# Patient Record
Sex: FEMALE | Race: WHITE | NOT HISPANIC OR LATINO | Employment: FULL TIME | URBAN - METROPOLITAN AREA
[De-identification: names, ages, dates, MRNs, and addresses within clinical notes are randomized per-mention and may not be internally consistent; named-entity substitution may affect disease eponyms.]

---

## 2018-10-15 ENCOUNTER — HOSPITAL ENCOUNTER (EMERGENCY)
Facility: HOSPITAL | Age: 23
Discharge: HOME | End: 2018-10-15
Attending: EMERGENCY MEDICINE

## 2018-10-15 VITALS
BODY MASS INDEX: 20.37 KG/M2 | SYSTOLIC BLOOD PRESSURE: 115 MMHG | HEART RATE: 83 BPM | WEIGHT: 110.69 LBS | RESPIRATION RATE: 14 BRPM | TEMPERATURE: 99 F | DIASTOLIC BLOOD PRESSURE: 67 MMHG | OXYGEN SATURATION: 97 % | HEIGHT: 62 IN

## 2018-10-15 DIAGNOSIS — T14.8XXA MUSCLE STRAIN: Primary | ICD-10-CM

## 2018-10-15 DIAGNOSIS — M79.669 CALF PAIN: ICD-10-CM

## 2018-10-15 LAB
B-HCG UR QL: NEGATIVE
CTP QC/QA: YES

## 2018-10-15 PROCEDURE — 81025 URINE PREGNANCY TEST: CPT | Performed by: NURSE PRACTITIONER

## 2018-10-15 PROCEDURE — 63600175 PHARM REV CODE 636 W HCPCS: Performed by: NURSE PRACTITIONER

## 2018-10-15 PROCEDURE — 99284 EMERGENCY DEPT VISIT MOD MDM: CPT | Mod: 25

## 2018-10-15 RX ORDER — KETOROLAC TROMETHAMINE 30 MG/ML
30 INJECTION, SOLUTION INTRAMUSCULAR; INTRAVENOUS
Status: COMPLETED | OUTPATIENT
Start: 2018-10-15 | End: 2018-10-15

## 2018-10-15 RX ORDER — METHOCARBAMOL 500 MG/1
1000 TABLET, FILM COATED ORAL 3 TIMES DAILY
Qty: 30 TABLET | Refills: 0 | Status: SHIPPED | OUTPATIENT
Start: 2018-10-15 | End: 2018-10-20

## 2018-10-15 RX ORDER — NAPROXEN 500 MG/1
500 TABLET ORAL 2 TIMES DAILY WITH MEALS
Qty: 10 TABLET | Refills: 0 | Status: SHIPPED | OUTPATIENT
Start: 2018-10-15 | End: 2020-09-16

## 2018-10-15 RX ADMIN — KETOROLAC TROMETHAMINE 30 MG: 30 INJECTION, SOLUTION INTRAMUSCULAR at 05:10

## 2018-10-15 NOTE — ED PROVIDER NOTES
Encounter Date: 10/15/2018       History     Chief Complaint   Patient presents with    Leg Pain     right leg pain and discomfort for one month     Patient is 23-year-old female who denies past medical history who presents to ED with right lower leg pain and discomfort x1 month. Patient complained of pain to right calf and bottom of right foot.Denies trauma or injury.  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  Reports that the pain is worse with palpation.  Denies any alleviating factors.  Denies taking any medications for symptoms.  Denies fever, chills, neck pain/stiffness, SOB, chest pain, numbness, tingling.  Denies any other complaints at this time.      The history is provided by the patient.     Review of patient's allergies indicates:  No Known Allergies  History reviewed. No pertinent past medical history.  History reviewed. No pertinent surgical history.  History reviewed. No pertinent family history.  Social History     Tobacco Use    Smoking status: Current Every Day Smoker   Substance Use Topics    Alcohol use: No    Drug use: Not on file     Review of Systems   Constitutional: Negative for fever.   Respiratory: Negative for shortness of breath.    Cardiovascular: Negative for chest pain.   Musculoskeletal: Positive for arthralgias (right lower leg and foot pain). Negative for back pain, gait problem, joint swelling, neck pain and neck stiffness.   Neurological: Negative for weakness and numbness.   All other systems reviewed and are negative.      Physical Exam     Initial Vitals [10/15/18 1542]   BP Pulse Resp Temp SpO2   115/67 83 14 98.6 °F (37 °C) 97 %      MAP       --         Physical Exam    Vitals reviewed.  Constitutional: She appears well-developed and well-nourished. She is not diaphoretic. She is cooperative.  Non-toxic appearance. She does not have a sickly appearance.   HENT:   Head: Atraumatic.   Eyes: Pupils are equal, round, and reactive to light.   Neck:  Normal range of motion, full passive range of motion without pain and phonation normal. Neck supple.   Cardiovascular: Regular rhythm.   Pulses:       Dorsalis pedis pulses are 2+ on the right side, and 2+ on the left side.   TTP to posterior calf.   No calf swelling noted.  Negative Sherry's sign.   Pulmonary/Chest: Effort normal and breath sounds normal. No respiratory distress.   Musculoskeletal:   TTP to plantar aspect of right foot.   No warmth or surrounding erythema.    Neurological: She is alert and oriented to person, place, and time. Gait normal.   Steady gait.    Skin: Skin is warm, dry and intact. Capillary refill takes less than 2 seconds. No rash noted.   Psychiatric: She has a normal mood and affect.         ED Course   Procedures  Labs Reviewed   POCT URINE PREGNANCY          Imaging Results          US Lower Extremity Veins Right (Final result)  Result time 10/15/18 16:44:05    Final result by Edvin Hickey MD (10/15/18 16:44:05)                 Impression:      No evidence of deep venous thrombosis in the right lower extremity.      Electronically signed by: Edvin Hickey MD  Date:    10/15/2018  Time:    16:44             Narrative:    EXAMINATION:  US LOWER EXTREMITY VEINS RIGHT    CLINICAL HISTORY:  Pain in unspecified lower leg    TECHNIQUE:  Duplex and color flow Doppler evaluation and graded compression of the right lower extremity veins was performed.    COMPARISON:  None    FINDINGS:  Right thigh veins: The common femoral, femoral, popliteal, upper greater saphenous, and deep femoral veins are patent and free of thrombus. The veins are normally compressible and have normal phasic flow and augmentation response.    Right calf veins: The visualized calf veins are patent.    Contralateral CFV: The contralateral (left) common femoral vein is patent and free of thrombus.    Miscellaneous: None                                 Medical Decision Making:   History:   Old Medical Records: I decided  to obtain old medical records.  Initial Assessment:   Patient presents to ED with right lower leg pain and discomfort x1 month.  Appears well, nontoxic.  Afebrile.  TTP to posterior calf.  No calf swelling noted. Negative Homans signs. Respirations even unlabored.  Lungs CTA.  No respiratory distress.  TTP to plantar aspect of right foot.  No warmth or surrounding erythema.  Steady gait.  Differential Diagnosis:   Sprain, strain, DVT  Clinical Tests:   Lab Tests: Ordered and Reviewed  Radiological Study: Ordered and Reviewed  ED Management:  POT pregnancy, US, pain meds  UPT negative.  No signs of cellulitis, septic joint, or DVT.  Patient's signs and symptoms most likely due to muscle strain.  Patient is stable will be DC home with prescriptions for Robaxin and naproxen.  Patient instructed not to drive, drink alcohol, or operate machinery while taking Robaxin.  Patient instructed to follow up PCP and Podiatry tomorrow and to return to ED for any concerns.  Patient verbalizes DC instructions and is in compliance and agreement with treatment plan.                      Clinical Impression:   The primary encounter diagnosis was Muscle strain. A diagnosis of Calf pain was also pertinent to this visit.                             Chuy Enrique NP  10/17/18 0733

## 2018-10-15 NOTE — DISCHARGE INSTRUCTIONS
Please takePrescribed medication as labeled as needed for pain. Do not drive, drink alcohol, or operate machinery while taking Robaxin.  Follow up with PCP and Podiatry tomorrow and return to ED for any concerns.

## 2020-09-16 ENCOUNTER — HOSPITAL ENCOUNTER (EMERGENCY)
Facility: HOSPITAL | Age: 25
Discharge: PSYCHIATRIC OR SUBSTANCE USE FACILITY | End: 2020-09-16
Attending: EMERGENCY MEDICINE
Payer: MEDICAID

## 2020-09-16 VITALS
RESPIRATION RATE: 16 BRPM | TEMPERATURE: 99 F | HEART RATE: 78 BPM | OXYGEN SATURATION: 99 % | SYSTOLIC BLOOD PRESSURE: 121 MMHG | DIASTOLIC BLOOD PRESSURE: 70 MMHG

## 2020-09-16 DIAGNOSIS — F22 PARANOIA: ICD-10-CM

## 2020-09-16 DIAGNOSIS — F23 ACUTE PSYCHOSIS: Primary | ICD-10-CM

## 2020-09-16 DIAGNOSIS — Z00.8 MEDICAL CLEARANCE FOR PSYCHIATRIC ADMISSION: ICD-10-CM

## 2020-09-16 DIAGNOSIS — F22 DELUSIONS: ICD-10-CM

## 2020-09-16 PROBLEM — F29 PSYCHOSIS: Status: ACTIVE | Noted: 2020-09-16

## 2020-09-16 LAB
ALBUMIN SERPL BCP-MCNC: 4.7 G/DL (ref 3.5–5.2)
ALP SERPL-CCNC: 60 U/L (ref 55–135)
ALT SERPL W/O P-5'-P-CCNC: 17 U/L (ref 10–44)
AMPHET+METHAMPHET UR QL: NEGATIVE
ANION GAP SERPL CALC-SCNC: 13 MMOL/L (ref 8–16)
APAP SERPL-MCNC: <3 UG/ML (ref 10–20)
AST SERPL-CCNC: 22 U/L (ref 10–40)
B-HCG UR QL: NEGATIVE
BARBITURATES UR QL SCN>200 NG/ML: NEGATIVE
BASOPHILS # BLD AUTO: 0.03 K/UL (ref 0–0.2)
BASOPHILS NFR BLD: 0.4 % (ref 0–1.9)
BENZODIAZ UR QL SCN>200 NG/ML: NEGATIVE
BILIRUB SERPL-MCNC: 1.1 MG/DL (ref 0.1–1)
BILIRUB UR QL STRIP: ABNORMAL
BUN SERPL-MCNC: 8 MG/DL (ref 6–20)
BZE UR QL SCN: NEGATIVE
CALCIUM SERPL-MCNC: 9.4 MG/DL (ref 8.7–10.5)
CANNABINOIDS UR QL SCN: NORMAL
CHLORIDE SERPL-SCNC: 104 MMOL/L (ref 95–110)
CLARITY UR: ABNORMAL
CO2 SERPL-SCNC: 21 MMOL/L (ref 23–29)
COLOR UR: YELLOW
CREAT SERPL-MCNC: 0.7 MG/DL (ref 0.5–1.4)
CREAT UR-MCNC: 224.3 MG/DL (ref 15–325)
CTP QC/QA: YES
DIFFERENTIAL METHOD: ABNORMAL
EOSINOPHIL # BLD AUTO: 0 K/UL (ref 0–0.5)
EOSINOPHIL NFR BLD: 0.1 % (ref 0–8)
ERYTHROCYTE [DISTWIDTH] IN BLOOD BY AUTOMATED COUNT: 12.1 % (ref 11.5–14.5)
EST. GFR  (AFRICAN AMERICAN): >60 ML/MIN/1.73 M^2
EST. GFR  (NON AFRICAN AMERICAN): >60 ML/MIN/1.73 M^2
ETHANOL SERPL-MCNC: <10 MG/DL
GLUCOSE SERPL-MCNC: 151 MG/DL (ref 70–110)
GLUCOSE UR QL STRIP: NEGATIVE
HCT VFR BLD AUTO: 40 % (ref 37–48.5)
HGB BLD-MCNC: 14.2 G/DL (ref 12–16)
HGB UR QL STRIP: ABNORMAL
IMM GRANULOCYTES # BLD AUTO: 0.01 K/UL (ref 0–0.04)
IMM GRANULOCYTES NFR BLD AUTO: 0.1 % (ref 0–0.5)
KETONES UR QL STRIP: ABNORMAL
LEUKOCYTE ESTERASE UR QL STRIP: NEGATIVE
LYMPHOCYTES # BLD AUTO: 0.8 K/UL (ref 1–4.8)
LYMPHOCYTES NFR BLD: 9.1 % (ref 18–48)
MCH RBC QN AUTO: 31 PG (ref 27–31)
MCHC RBC AUTO-ENTMCNC: 35.5 G/DL (ref 32–36)
MCV RBC AUTO: 87 FL (ref 82–98)
METHADONE UR QL SCN>300 NG/ML: NEGATIVE
MICROSCOPIC COMMENT: NORMAL
MONOCYTES # BLD AUTO: 0.6 K/UL (ref 0.3–1)
MONOCYTES NFR BLD: 7.6 % (ref 4–15)
NEUTROPHILS # BLD AUTO: 6.9 K/UL (ref 1.8–7.7)
NEUTROPHILS NFR BLD: 82.7 % (ref 38–73)
NITRITE UR QL STRIP: NEGATIVE
NRBC BLD-RTO: 0 /100 WBC
OPIATES UR QL SCN: NEGATIVE
PCP UR QL SCN>25 NG/ML: NEGATIVE
PH UR STRIP: 6 [PH] (ref 5–8)
PLATELET # BLD AUTO: 266 K/UL (ref 150–350)
PMV BLD AUTO: 10.3 FL (ref 9.2–12.9)
POTASSIUM SERPL-SCNC: 3.5 MMOL/L (ref 3.5–5.1)
PROT SERPL-MCNC: 7.6 G/DL (ref 6–8.4)
PROT UR QL STRIP: ABNORMAL
RBC # BLD AUTO: 4.58 M/UL (ref 4–5.4)
RBC #/AREA URNS HPF: 2 /HPF (ref 0–4)
SALICYLATES SERPL-MCNC: <5 MG/DL (ref 15–30)
SARS-COV-2 RDRP RESP QL NAA+PROBE: NEGATIVE
SODIUM SERPL-SCNC: 138 MMOL/L (ref 136–145)
SP GR UR STRIP: >=1.03 (ref 1–1.03)
TOXICOLOGY INFORMATION: NORMAL
TSH SERPL DL<=0.005 MIU/L-ACNC: 0.82 UIU/ML (ref 0.4–4)
URN SPEC COLLECT METH UR: ABNORMAL
UROBILINOGEN UR STRIP-ACNC: 1 EU/DL
WBC # BLD AUTO: 8.31 K/UL (ref 3.9–12.7)

## 2020-09-16 PROCEDURE — 81025 URINE PREGNANCY TEST: CPT | Performed by: EMERGENCY MEDICINE

## 2020-09-16 PROCEDURE — 80307 DRUG TEST PRSMV CHEM ANLYZR: CPT

## 2020-09-16 PROCEDURE — 80053 COMPREHEN METABOLIC PANEL: CPT

## 2020-09-16 PROCEDURE — 85025 COMPLETE CBC W/AUTO DIFF WBC: CPT

## 2020-09-16 PROCEDURE — 99205 PR OFFICE/OUTPT VISIT, NEW, LEVL V, 60-74 MIN: ICD-10-PCS | Mod: ,,, | Performed by: PSYCHIATRY & NEUROLOGY

## 2020-09-16 PROCEDURE — 96372 THER/PROPH/DIAG INJ SC/IM: CPT

## 2020-09-16 PROCEDURE — 93005 ELECTROCARDIOGRAM TRACING: CPT

## 2020-09-16 PROCEDURE — 63600175 PHARM REV CODE 636 W HCPCS: Performed by: EMERGENCY MEDICINE

## 2020-09-16 PROCEDURE — 80329 ANALGESICS NON-OPIOID 1 OR 2: CPT

## 2020-09-16 PROCEDURE — 99205 OFFICE O/P NEW HI 60 MIN: CPT | Mod: ,,, | Performed by: PSYCHIATRY & NEUROLOGY

## 2020-09-16 PROCEDURE — 81000 URINALYSIS NONAUTO W/SCOPE: CPT | Mod: 59

## 2020-09-16 PROCEDURE — 25000003 PHARM REV CODE 250: Performed by: EMERGENCY MEDICINE

## 2020-09-16 PROCEDURE — 84443 ASSAY THYROID STIM HORMONE: CPT

## 2020-09-16 PROCEDURE — S0166 INJ OLANZAPINE 2.5MG: HCPCS | Performed by: EMERGENCY MEDICINE

## 2020-09-16 PROCEDURE — 99285 EMERGENCY DEPT VISIT HI MDM: CPT | Mod: 25

## 2020-09-16 PROCEDURE — U0002 COVID-19 LAB TEST NON-CDC: HCPCS

## 2020-09-16 PROCEDURE — 80320 DRUG SCREEN QUANTALCOHOLS: CPT

## 2020-09-16 RX ORDER — OLANZAPINE 10 MG/2ML
10 INJECTION, POWDER, FOR SOLUTION INTRAMUSCULAR ONCE AS NEEDED
Status: COMPLETED | OUTPATIENT
Start: 2020-09-16 | End: 2020-09-16

## 2020-09-16 RX ORDER — DIPHENHYDRAMINE HYDROCHLORIDE 50 MG/ML
50 INJECTION INTRAMUSCULAR; INTRAVENOUS EVERY 4 HOURS PRN
Status: DISCONTINUED | OUTPATIENT
Start: 2020-09-16 | End: 2020-09-16

## 2020-09-16 RX ADMIN — LORAZEPAM 1 MG: 2 INJECTION INTRAMUSCULAR; INTRAVENOUS at 10:09

## 2020-09-16 RX ADMIN — OLANZAPINE 10 MG: 10 INJECTION, POWDER, LYOPHILIZED, FOR SOLUTION INTRAMUSCULAR at 10:09

## 2020-09-16 NOTE — ED NOTES
Pt sleeping without distress. Arousable to voice. Has no needs or complaints at this time. Sitter at bedside.

## 2020-09-16 NOTE — ED NOTES
"Pt continues to randomly scream out "Paddave Stephen". Cooperative with medication administration. Sitter at bedside.   "

## 2020-09-16 NOTE — CONSULTS
"PSYCHIATRY ED CONSULT NOTE      9/16/2020 11:05 AM   Jami Bach   1995   [id number]           DATE OF ADMISSION: 9/16/2020  9:49 AM    SITE: [facility]    CURRENT LEGAL STATUS: Patient currently meets PEC criteria due to being gravely disabled 2/2 mental illness at this time.       HISTORY    Per ED MD:  Chief Complaint   Patient presents with    Mental Health Problem       h/o PTSD, depression. EMS called to home by S.O. when patient began acting out, screaming that she was possessed. EMS reports combative at scene but on arrival here, talking to self but not acting out.    Jami Bach is a 24 y.o. female who  has a past medical history of PTSD (post-traumatic stress disorder).  The patient presents to the ED due to acute psychosis.  Per EMS, family called as patient was screaming she was possessed.  EMS reports she was combative on arrival but is calm on initial assessment in ER.  She has rambling, pressured speech, stating she is possessed.  She states her family members are also possessed.  She repeatedly states "Father Stephen, Father Stephen."  She states there is blood all around the floor of the house. She is closing her eyes and praying throughout the initial evaluation.  She was unable to cooperate with any further questioning.  Additional history obtained via EMS report and chart review.    Per ED RN:  Patient states that she has not been taking her medications - cannot validate what meds she is suppose to be on. States that she is being possessed by her brother b/c her mother touched a [employer] board when she was 14 in order to get the son that she always wanted. Patient states that God is saving her and repeated says "Save me Father Stephen". Cooperative with staff on arrival.   Pt up at the door yelling "sandra griffith" multiple times. She states she is being possessed by her brother and she needs to go outside for a little while. Pt is able to be redirected to her bed however she is " "restless.       Chief Complaint / Reason for Psychiatry Consult: psychosis/dinh       HPI   Jami Bach is a 24 y.o. female with no known past medical history and a past psychiatric history of PTSD, currently in the [facility] ED with acute psychosis/dinh as noted above in ED MD documentation.  Psychiatry was originally consulted as noted above.  The patient was seen and examined.  The chart was reviewed.  On examination today, the patient was lying on her side in the fetal position and refused to participate in the psychiatric assessment.  On multiple attempts at redirection to the psychiatric interview, the patient would open her eyes briefly, never make eye contact, and then close them, refusing to participate.  She appeared paranoid and guarded.  Patient required PRN administration in ED for acute agitated non-redirectable psychosis/dinh.        Collateral:  Spoke to Gabbi (patient's mother at [phone]) who stated that patient has been acting increasingly bizarre and paranoid recently.  She stated that the patient has been shaking and screaming and endorsing AH & VH of  relatives.  She stated that the patient hasn't been having good PO intake and hasn't been tending to self-care well recently.  She states that this is "completely out of the blue" for the patient and not consistent with her baseline in any way.  The patient's mother feels that the patient is gravely disabled due to her current altered mental state.  She is unaware of any substance abuse or medication use (but she added that she doesn't live with the patient).        Psychiatric Review Of Systems - Currently, the patient is endorsing and/or denying the following:  Attempted to assess but unable to assess due to AMS (as note in HPI above)(see ED MD note above)      ROS  General ROS: Attempted to assess but unable to assess due to AMS (as note in HPI above)  Ophthalmic ROS: Attempted to assess but unable to assess due to " AMS (as note in HPI above)  ENT ROS: Attempted to assess but unable to assess due to AMS (as note in HPI above)  Allergy and Immunology ROS: Attempted to assess but unable to assess due to AMS (as note in HPI above)  Hematological and Lymphatic ROS: Attempted to assess but unable to assess due to AMS (as note in HPI above)  Endocrine ROS: Attempted to assess but unable to assess due to AMS (as note in HPI above)  Respiratory ROS: Attempted to assess but unable to assess due to AMS (as note in HPI above)  Cardiovascular ROS: Attempted to assess but unable to assess due to AMS (as note in HPI above)  Gastrointestinal ROS: Attempted to assess but unable to assess due to AMS (as note in HPI above)  Genito-Urinary ROS: Attempted to assess but unable to assess due to AMS (as note in HPI above)  Musculoskeletal ROS: Attempted to assess but unable to assess due to AMS (as note in HPI above)   Neurological ROS: Attempted to assess but unable to assess due to AMS (as note in HPI above)  Dermatological ROS: Attempted to assess but unable to assess due to AMS (as note in HPI above)  Psychiatric ROS: Attempted to assess but unable to assess due to AMS (as note in HPI above)       Past Psychiatric History:  Attempted to assess but unable to assess due to AMS (as note in HPI above)    Social History:  Attempted to assess but unable to assess due to AMS (as note in HPI above)    Family Psychiatric History:   Attempted to assess but unable to assess due to AMS (as note in HPI above)    Substance Abuse History:  Attempted to assess but unable to assess due to AMS (as note in HPI above)  Ethanol < 10 today; UDS pending     Legal History:  Attempted to assess but unable to assess due to AMS (as note in HPI above)    Psychosocial Stressors: Attempted to assess but unable to assess due to AMS (as note in HPI above)  Functioning Relationships: Attempted to assess but unable to assess due to AMS (as note in HPI above)  Strengths AND  Liabilities: Attempted to assess but unable to assess due to AMS (as note in HPI above)      PAST MEDICAL & SURGICAL HISTORY   Past Medical History:   Diagnosis Date    PTSD (post-traumatic stress disorder)      History reviewed. No pertinent surgical history.    NEUROLOGIC HISTORY  Seizures: Attempted to assess but unable to assess due to AMS (as note in HPI above)  Head trauma: Attempted to assess but unable to assess due to AMS (as note in HPI above)    FAMILY HISTORY   History reviewed. No pertinent family history.    ALLERGIES   Review of patient's allergies indicates:  No Known Allergies    CURRENT MEDICATION REGIMEN   Home Meds:   Prior to Admission medications    Medication Sig Start Date End Date Taking? Authorizing Provider   naproxen (NAPROSYN) 500 MG tablet Take 1 tablet (500 mg total) by mouth 2 (two) times daily with meals. 10/15/18 9/16/20  Chuy Enrique NP       Scheduled Meds:    PRN Meds: lorazepam   Psychotherapeutics (From admission, onward)    Start     Stop Route Frequency Ordered    09/16/20 1029  lorazepam (ATIVAN) injection 1 mg  (Psych Hold Orders)      -- IM Every 4 hours PRN 09/16/20 1029          LABORATORY DATA   Recent Results (from the past 72 hour(s))   CBC auto differential    Collection Time: 09/16/20 10:03 AM   Result Value Ref Range    WBC 8.31 3.90 - 12.70 K/uL    RBC 4.58 4.00 - 5.40 M/uL    Hemoglobin 14.2 12.0 - 16.0 g/dL    Hematocrit 40.0 37.0 - 48.5 %    Mean Corpuscular Volume 87 82 - 98 fL    Mean Corpuscular Hemoglobin 31.0 27.0 - 31.0 pg    Mean Corpuscular Hemoglobin Conc 35.5 32.0 - 36.0 g/dL    RDW 12.1 11.5 - 14.5 %    Platelets 266 150 - 350 K/uL    MPV 10.3 9.2 - 12.9 fL    Immature Granulocytes 0.1 0.0 - 0.5 %    Gran # (ANC) 6.9 1.8 - 7.7 K/uL    Immature Grans (Abs) 0.01 0.00 - 0.04 K/uL    Lymph # 0.8 (L) 1.0 - 4.8 K/uL    Mono # 0.6 0.3 - 1.0 K/uL    Eos # 0.0 0.0 - 0.5 K/uL    Baso # 0.03 0.00 - 0.20 K/uL    nRBC 0 0 /100 WBC    Gran% 82.7 (H) 38.0  - 73.0 %    Lymph% 9.1 (L) 18.0 - 48.0 %    Mono% 7.6 4.0 - 15.0 %    Eosinophil% 0.1 0.0 - 8.0 %    Basophil% 0.4 0.0 - 1.9 %    Differential Method Automated    Comprehensive metabolic panel    Collection Time: 09/16/20 10:03 AM   Result Value Ref Range    Sodium 138 136 - 145 mmol/L    Potassium 3.5 3.5 - 5.1 mmol/L    Chloride 104 95 - 110 mmol/L    CO2 21 (L) 23 - 29 mmol/L    Glucose 151 (H) 70 - 110 mg/dL    BUN, Bld 8 6 - 20 mg/dL    Creatinine 0.7 0.5 - 1.4 mg/dL    Calcium 9.4 8.7 - 10.5 mg/dL    Total Protein 7.6 6.0 - 8.4 g/dL    Albumin 4.7 3.5 - 5.2 g/dL    Total Bilirubin 1.1 (H) 0.1 - 1.0 mg/dL    Alkaline Phosphatase 60 55 - 135 U/L    AST 22 10 - 40 U/L    ALT 17 10 - 44 U/L    Anion Gap 13 8 - 16 mmol/L    eGFR if African American >60 >60 mL/min/1.73 m^2    eGFR if non African American >60 >60 mL/min/1.73 m^2   TSH    Collection Time: 09/16/20 10:03 AM   Result Value Ref Range    TSH 0.818 0.400 - 4.000 uIU/mL   Ethanol    Collection Time: 09/16/20 10:03 AM   Result Value Ref Range    Alcohol, Medical, Serum <10 <10 mg/dL   Acetaminophen level    Collection Time: 09/16/20 10:03 AM   Result Value Ref Range    Acetaminophen (Tylenol), Serum <3.0 (L) 10.0 - 20.0 ug/mL   Salicylate level    Collection Time: 09/16/20 10:03 AM   Result Value Ref Range    Salicylate Lvl <5.0 (L) 15.0 - 30.0 mg/dL      No results found for: PHENYTOIN, PHENOBARB, VALPROATE, CBMZ      EXAMINATION    VITALS   Vitals:    09/16/20 1015   BP: 125/82   BP Location: Left arm   Patient Position: Sitting   Pulse: (!) 118   Resp: 16   Temp: 100.1 °F (37.8 °C)   TempSrc: Oral   SpO2: 99%        CONSTITUTIONAL  General Appearance: NAD, unremarkable, age appropriate, thin & lying in bed in fetal position     MUSCULOSKELETAL  Muscle Strength and Tone: Attempted to assess but unable to assess due to AMS (as note in HPI above)   Abnormal Involuntary Movements: none observed   Gait and Station: Attempted to assess but unable to assess due  to AMS (as note in HPI above)    PSYCHIATRIC   Behavior/Cooperation:  uncooperative, no eye contact, paranoid/guarded  Speech:  non-verbal / mute   Language: Attempted to assess but unable to assess due to AMS (as note in HPI above)  Mood: Attempted to assess but unable to assess due to AMS (as note in HPI above)  Affect:  Tense/Guarded/Paranoid  Associations: Attempted to assess but unable to assess due to AMS (as note in HPI above)  Thought Process: Attempted to assess but unable to assess due to AMS (as note in HPI above)  Thought Content: Attempted to assess but unable to assess due to AMS (as note in HPI above)  Sensorium:  Awake/Somnolent  Alert and Oriented: Attempted to assess but unable to assess due to AMS (as note in HPI above)  Memory: Attempted to assess but unable to assess due to AMS (as note in HPI above)  Attention/concentration: Attempted to assess but unable to assess due to AMS (as note in HPI above)  Similarities:  Attempted to assess but unable to assess due to AMS (as note in HPI above)  Abstract reasoning:  Attempted to assess but unable to assess due to AMS (as note in HPI above)  Insight: Attempted to assess but unable to assess due to AMS (as note in HPI above)  Judgment: Poor AEB recent events; currently, attempted to assess but unable to assess due to AMS (as note in HPI above)      MEDICAL DECISION MAKING    ASSESSMENT      Unspecified Schizophrenia Spectrum and Other Psychotic Disorder (Unspecified Psychosis)  Unspecified Bipolar and Related Disorder (Unspecified Mood Disorder)  Hx of PTSD  (Rule out SIPD/SIMD; UDS pending)     RECOMMENDATIONS       - Patient currently meets PEC criteria due to being gravely disabled 2/2 mental illness at this time (as noted in history above).      - Once medically cleared, seek inpatient psychiatric admission for treatment / stabilization.    - Defer scheduled psychiatric medications to inpatient treatment team     - Defer non-psychiatric medications  to ED MD     - Can use Zyprexa 10 mg PO/IM q8 hours PRN for non-redirectable psychotic/manic agitation (do not administer within one hour of any benzodiazepine medication; do not exceed 30 mg total per 24 hours)     - Continue suicide/violence precautions; continue to monitor patient with sitter while seeking inpatient psychiatric admission      - f/u UDS to assess for substance-induced etiology    - recommend ordering Head CT given that patient has no history of psychosis/dinh per mother (see collateral info above)       More than 50% of the time was spent counseling/coordinating care      STAFF:  Kerwin Zapien MD  [facility]  9/16/2020

## 2020-09-16 NOTE — ED TRIAGE NOTES
"Patient states that she has not been taking her medications - cannot validate what meds she is suppose to be on. States that she is being possessed by her brother b/c her mother touched a [employer] board when she was 14 in order to get the son that she always wanted. Patient states that God is saving her and repeated says "Save me Father Stephen". Cooperative with staff on arrival.   "

## 2020-09-16 NOTE — ED NOTES
Pt boyfriend called and wanted to report pt behavior.  He says they have been living together for the last 2 and 1/2 years. He has noticed a gradual increase in paranoia and delusions over the last week. This morning they woke up and went for a walk. When she went to take a shower she became erratic, shaking, and yelling out names he had never heard of. He says she refused to open her eyes when he tried to console her and when she did finally open her eyes she kept saying he was her brother. She was inconsolable so he called 911. He says he has never witnessed this type of behavior from her in the past and that she does not take medications that he knows of. No talk of SI or HI according to him. His name is Elmer Mata and his phone number is [phone]. No personal information given to him regarding pt status or plan of care. Secure chat message sent to Dr Zapien with this information.

## 2020-09-16 NOTE — ED PROVIDER NOTES
"Encounter Date: 9/16/2020       History     Chief Complaint   Patient presents with    Mental Health Problem     h/o PTSD, depression. EMS called to home by S.O. when patient began acting out, screaming that she was possessed. EMS reports combative at scene but on arrival here, talking to self but not acting out.      Jami Bach is a 24 y.o. female who  has a past medical history of PTSD (post-traumatic stress disorder).    The patient presents to the ED due to acute psychosis.  Per EMS, family called as patient was screaming she was possessed.  EMS reports she was combative on arrival but is calm on initial assessment in ER.  She has rambling, pressured speech, stating she is possessed.  She states her family members are also possessed.  She repeatedly states "Father Stephen, Father Stephen."  She states there is blood all around the floor of the house. She is closing her eyes and praying throughout the initial evaluation.    She was unable to cooperate with any further questioning.  Additional history obtained via EMS report and chart review.    Per family, patient has no known psych history and has never needed psych hospitalization in the past.        Review of patient's allergies indicates:  No Known Allergies  Past Medical History:   Diagnosis Date    PTSD (post-traumatic stress disorder)      History reviewed. No pertinent surgical history.  History reviewed. No pertinent family history.  Social History     Tobacco Use    Smoking status: Current Every Day Smoker   Substance Use Topics    Alcohol use: No    Drug use: Not on file     Review of Systems   Unable to perform ROS: Mental status change       Physical Exam     Initial Vitals [09/16/20 1015]   BP Pulse Resp Temp SpO2   125/82 (!) 118 16 100.1 °F (37.8 °C) 99 %      MAP       --         Physical Exam    Nursing note and vitals reviewed.  Constitutional: She appears well-developed and well-nourished. She is not diaphoretic. No distress. "   Hyperverbal, hyper Moravian, uncooperative.   HENT:   Head: Normocephalic and atraumatic.   Mouth/Throat: Oropharynx is clear and moist.   Eyes: EOM are normal. Pupils are equal, round, and reactive to light.   Neck: No tracheal deviation present.   Cardiovascular: Normal rate, regular rhythm, normal heart sounds and intact distal pulses.   Pulmonary/Chest: Breath sounds normal. No stridor. No respiratory distress. She has no wheezes.   Abdominal: Soft. Bowel sounds are normal. She exhibits no distension and no mass. There is no abdominal tenderness.   Musculoskeletal: Normal range of motion. No edema.   Neurological: She is alert and oriented to person, place, and time. She has normal strength. No cranial nerve deficit or sensory deficit.   Skin: Skin is warm and dry. Capillary refill takes less than 2 seconds. No pallor.   Psychiatric: Her affect is labile. Her speech is rapid and/or pressured and tangential. She is hyperactive and actively hallucinating. Thought content is delusional. Cognition and memory are impaired. She expresses impulsivity. She is inattentive.         ED Course   Procedures  Labs Reviewed   CBC W/ AUTO DIFFERENTIAL - Abnormal; Notable for the following components:       Result Value    Lymph # 0.8 (*)     Gran% 82.7 (*)     Lymph% 9.1 (*)     All other components within normal limits   COMPREHENSIVE METABOLIC PANEL - Abnormal; Notable for the following components:    CO2 21 (*)     Glucose 151 (*)     Total Bilirubin 1.1 (*)     All other components within normal limits   URINALYSIS, REFLEX TO URINE CULTURE - Abnormal; Notable for the following components:    Appearance, UA Hazy (*)     Specific Gravity, UA >=1.030 (*)     Protein, UA Trace (*)     Ketones, UA 3+ (*)     Bilirubin (UA) 1+ (*)     Occult Blood UA 3+ (*)     All other components within normal limits    Narrative:     Specimen Source->Urine   ACETAMINOPHEN LEVEL - Abnormal; Notable for the following components:     Acetaminophen (Tylenol), Serum <3.0 (*)     All other components within normal limits   SALICYLATE LEVEL - Abnormal; Notable for the following components:    Salicylate Lvl <5.0 (*)     All other components within normal limits   TSH   DRUG SCREEN PANEL, URINE EMERGENCY    Narrative:     Specimen Source->Urine   ALCOHOL,MEDICAL (ETHANOL)   SARS-COV-2 RNA AMPLIFICATION, QUAL   URINALYSIS MICROSCOPIC    Narrative:     Specimen Source->Urine   POCT URINE PREGNANCY     EKG Readings: (Independently Interpreted)   Initial Reading: No STEMI. Previous EKG: Compared with most recent EKG Rhythm: Normal Sinus Rhythm.   Normal sinus rhythm with sinus arrhythmia, rate 84, no ST changes or ischemia, normal intervals.  No prior for comparison.     ECG Results          EKG 12-lead (In process)  Result time 09/16/20 14:20:04    In process by Interface, Lab In [facility] (09/16/20 14:20:04)                 Narrative:    Test Reason : Z00.8,    Vent. Rate : 084 BPM     Atrial Rate : 084 BPM     P-R Int : 128 ms          QRS Dur : 088 ms      QT Int : 384 ms       P-R-T Axes : 075 081 079 degrees     QTc Int : 453 ms    Normal sinus rhythm with sinus arrhythmia  Normal ECG  No previous ECGs available    Referred By: [email or web address]   SELF           Confirmed By:                             Imaging Results          CT Head Without Contrast (Final result)  Result time 09/16/20 14:24:56    Final result by Waqas Whipple MD (09/16/20 14:24:56)                 Impression:      1. No acute intracranial abnormalities.      Electronically signed by: Waqas Whipple MD  Date:    09/16/2020  Time:    14:24             Narrative:    EXAMINATION:  CT HEAD WITHOUT CONTRAST    CLINICAL HISTORY:  Altered mental status;    TECHNIQUE:  Low dose axial images were obtained through the head.  Coronal and sagittal reformations were also performed. Contrast was not administered.    COMPARISON:  None.    FINDINGS:  There is no evidence of acute major vascular  territory infarct, hemorrhage, or mass.  There is no hydrocephalus.  There are no abnormal extra-axial fluid collections.  The paranasal sinuses and mastoid air cells are clear, and there is no evidence of calvarial fracture.  The visualized soft tissues are unremarkable.                                 Medical Decision Making:   History:   Old Medical Records: I decided to obtain old medical records.  Old Records Summarized: other records.       <> Summary of Records: No prior psychiatric history, no current medications on file.  Initial Assessment:   Patient is a 24 y.o. female presenting to ED with acute psychosis.   On arrival, vitals unremarkable, exam with pressured, hyper-Voodoo speech, stating she is possessed and she wants to sing and pray.  Due to concern for decompensated psychiatric disease, I feel patient is gravely disabled and a potential danger to self and others.  Will place under PEC, obtain medical screening labs, and anticipate transfer to Psych facility when medically cleared.  Differential Diagnosis:   Differential Diagnosis includes, but is not limited to:  Decompensated psychiatric disease (schizophrenia, bipolar disorder, major depression), excited delirium, medication noncompliance, substance abuse/withdrawal, intentional drug overdose, medication toxicity, APAP/ASA overdose, acute stress reaction, personality disorder, malingering, metabolic derangement    Independently Interpreted Test(s):   I have ordered and independently interpreted X-rays - see summary below.       <> Summary of X-Ray Reading(s): CT head: no acute process  I have ordered and independently interpreted EKG Reading(s) - see summary below       <> Summary of EKG Reading(s): EKG: no ischemia or arrhythmia.  Clinical Tests:   Lab Tests: Reviewed and Ordered  Radiological Study: Ordered and Reviewed  Medical Tests: Ordered and Reviewed  ED Management:  After complete evaluation, including thorough history and physical  exam, the patient's symptoms are most likely due to acute psychiatric illness. There are no features of history or physical exam indicative of acute medical illness. Vital signs have been stable throughout ED course.     Due to patient's presentation, history, and current condition, a PEC was completed for the safety of the patient and medical staff during evaluation and management.  One-to-one observation was initiated.     Labs were obtained, unremarkable.  EKG revealed no ischemia or arrhythmia.  CT head without acute process.   The patient is currently medically cleared for psychiatric evaluation and transfer to inpatient psychiatric facility as necessary.    Patient was evaluated by Dr. Zapien, Psychiatry, who agrees with plan of care.      On re-evaluation, the patient's status has remained stable.  At this time, I believe the patient should be transferred to psych facility for further evaluation and management of acute psychosis.    The patient and family were updated with test results, overall impression, and further plan of care. All questions were answered. The patient expressed understanding and agrees with the current plan.                          Medically cleared for psychiatry placement: 9/16/2020  2:30 PM                Clinical Impression:       ICD-10-CM ICD-9-CM   1. Acute psychosis  F23 298.9   2. Medical clearance for psychiatric admission  Z00.8 V70.8   3. Paranoia  F22 297.1   4. Delusions  F22 297.9                          ED Disposition Condition    Transfer to Psych Facility         ED Prescriptions     None        Follow-up Information    None                                      Brando Carmichael MD  09/16/20 1104

## 2020-09-16 NOTE — ED NOTES
"Pt up at the door yelling "sandra griffith" multiple times. She states she is being possessed by her brother and she needs to go outside for a little while. Pt is able to be redirected to her bed however she is restless.   "

## 2020-09-16 NOTE — ED NOTES
Pt ambulates to restroom with assistance, able to urinate for sample. Pt back in bed with rails up and sitter at bedside.

## 2020-09-17 NOTE — ED NOTES
Pt escorted to transport vehicle via w/c with hospital security, sitter, and [employer] staff. Pt belongings bag was given to pt Mom before she left.

## 2020-09-17 NOTE — ED NOTES
[employer]D here to transport pt. Pt Mom outside ER requesting to see pt and threatening to call a  if she cannot see her daughter. ED tech went back outside to tell her she could come in for a few minutes per the charge nurse and the pt Mom was no longer outside. Bystanders state she left.

## 2022-10-24 ENCOUNTER — OCCUPATIONAL HEALTH (OUTPATIENT)
Dept: URGENT CARE | Facility: CLINIC | Age: 27
End: 2022-10-24

## 2022-10-24 DIAGNOSIS — Z13.9 ENCOUNTER FOR SCREENING: Primary | ICD-10-CM

## 2022-10-24 PROCEDURE — 86580 POCT TB SKIN TEST: ICD-10-PCS | Mod: S$GLB,,, | Performed by: EMERGENCY MEDICINE

## 2022-10-24 PROCEDURE — 86580 TB INTRADERMAL TEST: CPT | Mod: S$GLB,,, | Performed by: EMERGENCY MEDICINE

## 2022-10-26 LAB
TB INDURATION - 48 HR READ: 0 MM
TB INDURATION - 72 HR READ: 0 MM
TB SKIN TEST - 48 HR READ: NEGATIVE
TB SKIN TEST - 72 HR READ: NEGATIVE

## 2022-12-30 ENCOUNTER — HOSPITAL ENCOUNTER (OUTPATIENT)
Facility: HOSPITAL | Age: 27
Discharge: PSYCHIATRIC OR SUBSTANCE USE FACILITY | End: 2022-12-31
Attending: EMERGENCY MEDICINE | Admitting: STUDENT IN AN ORGANIZED HEALTH CARE EDUCATION/TRAINING PROGRAM
Payer: MEDICAID

## 2022-12-30 DIAGNOSIS — T50.902A INTENTIONAL DRUG OVERDOSE, INITIAL ENCOUNTER: ICD-10-CM

## 2022-12-30 DIAGNOSIS — R45.851 SUICIDAL IDEATION: ICD-10-CM

## 2022-12-30 DIAGNOSIS — T14.91XA SUICIDE ATTEMPT: Primary | ICD-10-CM

## 2022-12-30 DIAGNOSIS — Z00.8 MEDICAL CLEARANCE FOR PSYCHIATRIC ADMISSION: ICD-10-CM

## 2022-12-30 DIAGNOSIS — R07.9 CHEST PAIN: ICD-10-CM

## 2022-12-30 LAB
AMPHET+METHAMPHET UR QL: NEGATIVE
B-HCG UR QL: NEGATIVE
BARBITURATES UR QL SCN>200 NG/ML: NEGATIVE
BENZODIAZ UR QL SCN>200 NG/ML: NEGATIVE
BZE UR QL SCN: NEGATIVE
CANNABINOIDS UR QL SCN: NEGATIVE
CREAT UR-MCNC: 57.2 MG/DL (ref 15–325)
CTP QC/QA: YES
CTP QC/QA: YES
DELSYS: ABNORMAL
HCO3 UR-SCNC: 28.2 MMOL/L (ref 24–28)
LACTATE SERPL-SCNC: 0.9 MMOL/L (ref 0.5–2.2)
METHADONE UR QL SCN>300 NG/ML: NEGATIVE
OPIATES UR QL SCN: NEGATIVE
PCO2 BLDA: 51.6 MMHG (ref 35–45)
PCP UR QL SCN>25 NG/ML: NEGATIVE
PH SMN: 7.35 [PH] (ref 7.35–7.45)
PO2 BLDA: 15 MMHG (ref 40–60)
POC BE: 3 MMOL/L
POC SATURATED O2: 17 % (ref 95–100)
POC TCO2: 30 MMOL/L (ref 24–29)
SAMPLE: ABNORMAL
SARS-COV-2 RDRP RESP QL NAA+PROBE: NEGATIVE
TOXICOLOGY INFORMATION: NORMAL

## 2022-12-30 PROCEDURE — 25000003 PHARM REV CODE 250: Performed by: EMERGENCY MEDICINE

## 2022-12-30 PROCEDURE — 96374 THER/PROPH/DIAG INJ IV PUSH: CPT

## 2022-12-30 PROCEDURE — 81000 URINALYSIS NONAUTO W/SCOPE: CPT | Mod: 59 | Performed by: EMERGENCY MEDICINE

## 2022-12-30 PROCEDURE — 63600175 PHARM REV CODE 636 W HCPCS: Performed by: EMERGENCY MEDICINE

## 2022-12-30 PROCEDURE — 82077 ASSAY SPEC XCP UR&BREATH IA: CPT | Performed by: EMERGENCY MEDICINE

## 2022-12-30 PROCEDURE — 85025 COMPLETE CBC W/AUTO DIFF WBC: CPT | Performed by: EMERGENCY MEDICINE

## 2022-12-30 PROCEDURE — 87635 SARS-COV-2 COVID-19 AMP PRB: CPT | Performed by: EMERGENCY MEDICINE

## 2022-12-30 PROCEDURE — 83735 ASSAY OF MAGNESIUM: CPT | Performed by: EMERGENCY MEDICINE

## 2022-12-30 PROCEDURE — 93005 ELECTROCARDIOGRAM TRACING: CPT

## 2022-12-30 PROCEDURE — 80179 DRUG ASSAY SALICYLATE: CPT | Performed by: EMERGENCY MEDICINE

## 2022-12-30 PROCEDURE — 81025 URINE PREGNANCY TEST: CPT | Performed by: EMERGENCY MEDICINE

## 2022-12-30 PROCEDURE — 83605 ASSAY OF LACTIC ACID: CPT | Performed by: EMERGENCY MEDICINE

## 2022-12-30 PROCEDURE — 80143 DRUG ASSAY ACETAMINOPHEN: CPT | Performed by: EMERGENCY MEDICINE

## 2022-12-30 PROCEDURE — 99900035 HC TECH TIME PER 15 MIN (STAT)

## 2022-12-30 PROCEDURE — 84443 ASSAY THYROID STIM HORMONE: CPT | Performed by: EMERGENCY MEDICINE

## 2022-12-30 PROCEDURE — 80307 DRUG TEST PRSMV CHEM ANLYZR: CPT | Performed by: EMERGENCY MEDICINE

## 2022-12-30 PROCEDURE — 80053 COMPREHEN METABOLIC PANEL: CPT | Performed by: EMERGENCY MEDICINE

## 2022-12-30 PROCEDURE — 96361 HYDRATE IV INFUSION ADD-ON: CPT

## 2022-12-30 PROCEDURE — 85610 PROTHROMBIN TIME: CPT | Performed by: EMERGENCY MEDICINE

## 2022-12-30 PROCEDURE — 85730 THROMBOPLASTIN TIME PARTIAL: CPT | Performed by: EMERGENCY MEDICINE

## 2022-12-30 PROCEDURE — 93010 EKG 12-LEAD: ICD-10-PCS | Mod: ,,, | Performed by: INTERNAL MEDICINE

## 2022-12-30 PROCEDURE — 93010 ELECTROCARDIOGRAM REPORT: CPT | Mod: ,,, | Performed by: INTERNAL MEDICINE

## 2022-12-30 PROCEDURE — 82803 BLOOD GASES ANY COMBINATION: CPT

## 2022-12-30 PROCEDURE — 84100 ASSAY OF PHOSPHORUS: CPT | Performed by: EMERGENCY MEDICINE

## 2022-12-30 PROCEDURE — C9113 INJ PANTOPRAZOLE SODIUM, VIA: HCPCS | Performed by: EMERGENCY MEDICINE

## 2022-12-30 PROCEDURE — 99285 EMERGENCY DEPT VISIT HI MDM: CPT | Mod: 25

## 2022-12-30 RX ORDER — PANTOPRAZOLE SODIUM 40 MG/10ML
80 INJECTION, POWDER, LYOPHILIZED, FOR SOLUTION INTRAVENOUS ONCE
Status: COMPLETED | OUTPATIENT
Start: 2022-12-30 | End: 2022-12-30

## 2022-12-30 RX ADMIN — SODIUM CHLORIDE 1000 ML: 0.9 INJECTION, SOLUTION INTRAVENOUS at 10:12

## 2022-12-30 RX ADMIN — PANTOPRAZOLE SODIUM 80 MG: 40 INJECTION, POWDER, LYOPHILIZED, FOR SOLUTION INTRAVENOUS at 10:12

## 2022-12-30 NOTE — Clinical Note
Diagnosis: Suicide attempt [[id number]]   Future Attending Provider: RANDA STEWART [[id number]]   Admitting Provider:: SIMÓN JON [[id number]]

## 2022-12-31 VITALS
HEART RATE: 94 BPM | DIASTOLIC BLOOD PRESSURE: 74 MMHG | RESPIRATION RATE: 20 BRPM | SYSTOLIC BLOOD PRESSURE: 108 MMHG | BODY MASS INDEX: 16.5 KG/M2 | OXYGEN SATURATION: 100 % | WEIGHT: 99 LBS | HEIGHT: 65 IN | TEMPERATURE: 99 F

## 2022-12-31 PROBLEM — F29 PSYCHOSIS: Chronic | Status: ACTIVE | Noted: 2020-09-16

## 2022-12-31 PROBLEM — T50.901A OVERDOSE: Status: ACTIVE | Noted: 2022-12-31

## 2022-12-31 PROBLEM — T50.901A OVERDOSE: Chronic | Status: ACTIVE | Noted: 2022-12-31

## 2022-12-31 LAB
ALBUMIN SERPL BCP-MCNC: 3.9 G/DL (ref 3.5–5.2)
ALBUMIN SERPL BCP-MCNC: 3.9 G/DL (ref 3.5–5.2)
ALP SERPL-CCNC: 50 U/L (ref 55–135)
ALP SERPL-CCNC: 50 U/L (ref 55–135)
ALT SERPL W/O P-5'-P-CCNC: 13 U/L (ref 10–44)
ALT SERPL W/O P-5'-P-CCNC: 16 U/L (ref 10–44)
ANION GAP SERPL CALC-SCNC: 10 MMOL/L (ref 8–16)
ANION GAP SERPL CALC-SCNC: 12 MMOL/L (ref 8–16)
APAP SERPL-MCNC: <3 UG/ML (ref 10–20)
APTT BLDCRRT: 24.8 SEC (ref 21–32)
AST SERPL-CCNC: 17 U/L (ref 10–40)
AST SERPL-CCNC: 23 U/L (ref 10–40)
BACTERIA #/AREA URNS HPF: ABNORMAL /HPF
BASOPHILS # BLD AUTO: 0.02 K/UL (ref 0–0.2)
BASOPHILS NFR BLD: 0.3 % (ref 0–1.9)
BILIRUB SERPL-MCNC: 0.3 MG/DL (ref 0.1–1)
BILIRUB SERPL-MCNC: 0.7 MG/DL (ref 0.1–1)
BILIRUB UR QL STRIP: NEGATIVE
BUN SERPL-MCNC: 10 MG/DL (ref 6–20)
BUN SERPL-MCNC: 8 MG/DL (ref 6–20)
CALCIUM SERPL-MCNC: 8.3 MG/DL (ref 8.7–10.5)
CALCIUM SERPL-MCNC: 8.4 MG/DL (ref 8.7–10.5)
CHLORIDE SERPL-SCNC: 109 MMOL/L (ref 95–110)
CHLORIDE SERPL-SCNC: 110 MMOL/L (ref 95–110)
CLARITY UR: CLEAR
CO2 SERPL-SCNC: 17 MMOL/L (ref 23–29)
CO2 SERPL-SCNC: 18 MMOL/L (ref 23–29)
COLOR UR: YELLOW
CREAT SERPL-MCNC: 0.7 MG/DL (ref 0.5–1.4)
CREAT SERPL-MCNC: 0.8 MG/DL (ref 0.5–1.4)
DIFFERENTIAL METHOD: ABNORMAL
EOSINOPHIL # BLD AUTO: 0.2 K/UL (ref 0–0.5)
EOSINOPHIL NFR BLD: 3.1 % (ref 0–8)
ERYTHROCYTE [DISTWIDTH] IN BLOOD BY AUTOMATED COUNT: 12.7 % (ref 11.5–14.5)
ERYTHROCYTE [DISTWIDTH] IN BLOOD BY AUTOMATED COUNT: 12.7 % (ref 11.5–14.5)
EST. GFR  (NO RACE VARIABLE): >60 ML/MIN/1.73 M^2
EST. GFR  (NO RACE VARIABLE): >60 ML/MIN/1.73 M^2
ETHANOL SERPL-MCNC: <10 MG/DL
GLUCOSE SERPL-MCNC: 101 MG/DL (ref 70–110)
GLUCOSE SERPL-MCNC: 81 MG/DL (ref 70–110)
GLUCOSE UR QL STRIP: NEGATIVE
HCT VFR BLD AUTO: 35.7 % (ref 37–48.5)
HCT VFR BLD AUTO: 37.1 % (ref 37–48.5)
HGB BLD-MCNC: 12.6 G/DL (ref 12–16)
HGB BLD-MCNC: 12.9 G/DL (ref 12–16)
HGB UR QL STRIP: ABNORMAL
IMM GRANULOCYTES # BLD AUTO: 0.01 K/UL (ref 0–0.04)
IMM GRANULOCYTES NFR BLD AUTO: 0.1 % (ref 0–0.5)
INR PPP: 1.2 (ref 0.8–1.2)
KETONES UR QL STRIP: NEGATIVE
LEUKOCYTE ESTERASE UR QL STRIP: ABNORMAL
LYMPHOCYTES # BLD AUTO: 1.6 K/UL (ref 1–4.8)
LYMPHOCYTES NFR BLD: 20.7 % (ref 18–48)
MAGNESIUM SERPL-MCNC: 2.1 MG/DL (ref 1.6–2.6)
MCH RBC QN AUTO: 30.8 PG (ref 27–31)
MCH RBC QN AUTO: 31 PG (ref 27–31)
MCHC RBC AUTO-ENTMCNC: 34.8 G/DL (ref 32–36)
MCHC RBC AUTO-ENTMCNC: 35.3 G/DL (ref 32–36)
MCV RBC AUTO: 88 FL (ref 82–98)
MCV RBC AUTO: 89 FL (ref 82–98)
MICROSCOPIC COMMENT: ABNORMAL
MONOCYTES # BLD AUTO: 1.2 K/UL (ref 0.3–1)
MONOCYTES NFR BLD: 15.2 % (ref 4–15)
NEUTROPHILS # BLD AUTO: 4.8 K/UL (ref 1.8–7.7)
NEUTROPHILS NFR BLD: 60.6 % (ref 38–73)
NITRITE UR QL STRIP: NEGATIVE
NRBC BLD-RTO: 0 /100 WBC
PH UR STRIP: 6 [PH] (ref 5–8)
PHOSPHATE SERPL-MCNC: 3.1 MG/DL (ref 2.7–4.5)
PLATELET # BLD AUTO: 189 K/UL (ref 150–450)
PLATELET # BLD AUTO: 219 K/UL (ref 150–450)
PMV BLD AUTO: 10.3 FL (ref 9.2–12.9)
PMV BLD AUTO: 10.3 FL (ref 9.2–12.9)
POTASSIUM SERPL-SCNC: 3.6 MMOL/L (ref 3.5–5.1)
POTASSIUM SERPL-SCNC: 3.9 MMOL/L (ref 3.5–5.1)
PROT SERPL-MCNC: 6.4 G/DL (ref 6–8.4)
PROT SERPL-MCNC: 6.7 G/DL (ref 6–8.4)
PROT UR QL STRIP: NEGATIVE
PROTHROMBIN TIME: 12.5 SEC (ref 9–12.5)
RBC # BLD AUTO: 4.07 M/UL (ref 4–5.4)
RBC # BLD AUTO: 4.19 M/UL (ref 4–5.4)
RBC #/AREA URNS HPF: 11 /HPF (ref 0–4)
SALICYLATES SERPL-MCNC: <5 MG/DL (ref 15–30)
SODIUM SERPL-SCNC: 136 MMOL/L (ref 136–145)
SODIUM SERPL-SCNC: 140 MMOL/L (ref 136–145)
SP GR UR STRIP: 1.02 (ref 1–1.03)
SQUAMOUS #/AREA URNS HPF: 5 /HPF
TSH SERPL DL<=0.005 MIU/L-ACNC: 0.53 UIU/ML (ref 0.4–4)
URN SPEC COLLECT METH UR: ABNORMAL
UROBILINOGEN UR STRIP-ACNC: NEGATIVE EU/DL
WBC # BLD AUTO: 7.63 K/UL (ref 3.9–12.7)
WBC # BLD AUTO: 7.83 K/UL (ref 3.9–12.7)
WBC #/AREA URNS HPF: 3 /HPF (ref 0–5)

## 2022-12-31 PROCEDURE — 85027 COMPLETE CBC AUTOMATED: CPT | Performed by: STUDENT IN AN ORGANIZED HEALTH CARE EDUCATION/TRAINING PROGRAM

## 2022-12-31 PROCEDURE — 25000003 PHARM REV CODE 250: Performed by: HOSPITALIST

## 2022-12-31 PROCEDURE — G0378 HOSPITAL OBSERVATION PER HR: HCPCS

## 2022-12-31 PROCEDURE — 80053 COMPREHEN METABOLIC PANEL: CPT | Performed by: STUDENT IN AN ORGANIZED HEALTH CARE EDUCATION/TRAINING PROGRAM

## 2022-12-31 RX ORDER — NALOXONE HCL 0.4 MG/ML
0.02 VIAL (ML) INJECTION
Status: DISCONTINUED | OUTPATIENT
Start: 2022-12-31 | End: 2022-12-31 | Stop reason: HOSPADM

## 2022-12-31 RX ORDER — ACETAMINOPHEN 325 MG/1
650 TABLET ORAL EVERY 8 HOURS PRN
Status: DISCONTINUED | OUTPATIENT
Start: 2022-12-31 | End: 2022-12-31 | Stop reason: HOSPADM

## 2022-12-31 RX ORDER — IBUPROFEN 200 MG
24 TABLET ORAL
Status: DISCONTINUED | OUTPATIENT
Start: 2022-12-31 | End: 2022-12-31 | Stop reason: HOSPADM

## 2022-12-31 RX ORDER — TALC
6 POWDER (GRAM) TOPICAL NIGHTLY PRN
Status: DISCONTINUED | OUTPATIENT
Start: 2022-12-31 | End: 2022-12-31 | Stop reason: HOSPADM

## 2022-12-31 RX ORDER — SODIUM CHLORIDE 0.9 % (FLUSH) 0.9 %
10 SYRINGE (ML) INJECTION EVERY 12 HOURS PRN
Status: DISCONTINUED | OUTPATIENT
Start: 2022-12-31 | End: 2022-12-31 | Stop reason: HOSPADM

## 2022-12-31 RX ORDER — IBUPROFEN 200 MG
16 TABLET ORAL
Status: DISCONTINUED | OUTPATIENT
Start: 2022-12-31 | End: 2022-12-31 | Stop reason: HOSPADM

## 2022-12-31 RX ORDER — GLUCAGON 1 MG
1 KIT INJECTION
Status: DISCONTINUED | OUTPATIENT
Start: 2022-12-31 | End: 2022-12-31 | Stop reason: HOSPADM

## 2022-12-31 RX ORDER — PANTOPRAZOLE SODIUM 40 MG/1
40 TABLET, DELAYED RELEASE ORAL DAILY
Status: DISCONTINUED | OUTPATIENT
Start: 2022-12-31 | End: 2022-12-31 | Stop reason: HOSPADM

## 2022-12-31 RX ORDER — POTASSIUM CHLORIDE 20 MEQ/1
40 TABLET, EXTENDED RELEASE ORAL ONCE
Status: COMPLETED | OUTPATIENT
Start: 2022-12-31 | End: 2022-12-31

## 2022-12-31 RX ORDER — CLONAZEPAM 0.5 MG/1
0.5 TABLET ORAL NIGHTLY PRN
Status: DISCONTINUED | OUTPATIENT
Start: 2022-12-31 | End: 2022-12-31 | Stop reason: HOSPADM

## 2022-12-31 RX ADMIN — POTASSIUM CHLORIDE 40 MEQ: 1500 TABLET, EXTENDED RELEASE ORAL at 12:12

## 2022-12-31 RX ADMIN — PANTOPRAZOLE SODIUM 40 MG: 40 TABLET, DELAYED RELEASE ORAL at 08:12

## 2022-12-31 NOTE — SUBJECTIVE & OBJECTIVE
Past Medical History:   Diagnosis Date    Depression     PTSD (post-traumatic stress disorder)        History reviewed. No pertinent surgical history.    Review of patient's allergies indicates:  No Known Allergies    No current facility-administered medications on file prior to encounter.     Current Outpatient Medications on File Prior to Encounter   Medication Sig    clonazePAM (KLONOPIN) 0.5 MG tablet Take 1 tablet (0.5 mg total) by mouth nightly as needed (severe anxiety and/or difficulty sleeping).    risperiDONE (RISPERDAL) 2 MG tablet Take 1 tablet (2 mg total) by mouth 2 (two) times daily.     Family History    None       Tobacco Use    Smoking status: Every Day    Smokeless tobacco: Not on file   Substance and Sexual Activity    Alcohol use: No    Drug use: Not Currently    Sexual activity: Yes     Partners: Male     Review of Systems   Constitutional:  Negative for appetite change and chills.   HENT:  Negative for facial swelling and mouth sores.    Eyes:  Negative for photophobia and redness.   Respiratory:  Negative for cough and choking.    Gastrointestinal:  Negative for blood in stool and constipation.   Endocrine: Negative for polyphagia and polyuria.   Genitourinary:  Negative for dyspareunia and dysuria.   Musculoskeletal:  Negative for joint swelling and myalgias.   Skin:  Negative for rash and wound.   Allergic/Immunologic: Negative for food allergies and immunocompromised state.   Neurological:  Negative for seizures and numbness.   Hematological:  Negative for adenopathy. Does not bruise/bleed easily.   Psychiatric/Behavioral:  Positive for dysphoric mood, self-injury and suicidal ideas. Negative for hallucinations.    Objective:     Vital Signs (Most Recent):  Temp: 99.5 °F (37.5 °C) (12/30/22 2106)  Pulse: 79 (12/31/22 0202)  Resp: 16 (12/31/22 0202)  BP: 110/62 (12/31/22 0202)  SpO2: 100 % (12/31/22 0244) Vital Signs (24h Range):  Temp:  [99.5 °F (37.5 °C)] 99.5 °F (37.5 °C)  Pulse:   [79-90] 79  Resp:  [16-41] 16  SpO2:  [99 %-100 %] 100 %  BP: (110-116)/(62-66) 110/62     Weight: 44.9 kg (99 lb)  Body mass index is 16.47 kg/m².    Physical Exam  Constitutional:       Appearance: Normal appearance. She is not ill-appearing or diaphoretic.      Comments: Very thin   HENT:      Head: Normocephalic and atraumatic.      Right Ear: There is no impacted cerumen.      Left Ear: There is no impacted cerumen.      Nose: No congestion or rhinorrhea.      Mouth/Throat:      Pharynx: No oropharyngeal exudate or posterior oropharyngeal erythema.   Eyes:      General:         Right eye: No discharge.         Left eye: No discharge.   Neck:      Vascular: No carotid bruit.   Cardiovascular:      Heart sounds: No murmur heard.    No friction rub.   Pulmonary:      Breath sounds: No wheezing or rhonchi.   Abdominal:      Tenderness: There is no abdominal tenderness.      Hernia: No hernia is present.   Musculoskeletal:         General: No tenderness or signs of injury.      Cervical back: No tenderness.   Skin:     Findings: No erythema or rash.   Neurological:      General: No focal deficit present.      Coordination: Coordination normal.      Deep Tendon Reflexes: Reflexes normal.   Psychiatric:      Comments: Quiet,            Significant Labs: All pertinent labs within the past 24 hours have been reviewed.  A1C: No results for input(s): HGBA1C in the last 4320 hours.  Bilirubin:   Recent Labs   Lab 12/30/22  2354   BILITOT 0.3     Blood Culture: No results for input(s): LABBLOO in the last 48 hours.  BMP:   Recent Labs   Lab 12/30/22  2354   GLU 81      K 3.9      CO2 18*   BUN 10   CREATININE 0.7   CALCIUM 8.3*   MG 2.1     CBC:   Recent Labs   Lab 12/30/22  2354   WBC 7.83   HGB 12.6   HCT 35.7*        CMP:   Recent Labs   Lab 12/30/22  2354      K 3.9      CO2 18*   GLU 81   BUN 10   CREATININE 0.7   CALCIUM 8.3*   PROT 6.4   ALBUMIN 3.9   BILITOT 0.3   ALKPHOS 50*   AST 17    ALT 16   ANIONGAP 12     TSH:   Recent Labs   Lab 12/30/22  2354   TSH 0.532       Significant Imaging: I have reviewed all pertinent imaging results/findings within the past 24 hours.  I have reviewed and interpreted all pertinent imaging results/findings within the past 24 hours.

## 2022-12-31 NOTE — HPI
Jami Bach is a 27yr old female with PMH of depression, hx of past suicide attempts, and PTSD, who presents with overdose.     [facility] Apparently, patient has taken approximately  ibuprofen tabs over the past 2-6 hours, which was thought to be due to a manic episode. Boyfriend believes this episode started the day before, when girlfriend believed patient was too friendly with a guest. Patient prefers not to speak much.    In the ED, vitals were unremarkable. CBC was unremarkable. CMP was positive for hypocalcemia and slightly elevated alkaline phosphtase. Drug tox was negative. UA was positive for RBC and some trace leukocytes. VBG was positive for elevated PCO2.     ED PEC-ed the patient. They contacted poison control, who recommended observation.     Medicine was consulted for observation of pt's medical condition, with likely transfer to psychiatric services once medically clear

## 2022-12-31 NOTE — HOSPITAL COURSE
"Ms. Bach presented following suicide attempt with ingestion of between  ibuprofen tablets.  Some mild stomach upset overnight, but otherwise unremarkable exam.  Stomach pain has currently resolved and she is not had any bleeding.  Creatinine remains 0.8, which is her baseline.  Discussed with poison control line and she is cleared from ingestion standpoint.  Will discharge to inpatient psych.    /74   Pulse 95   Temp 98.1 °F (36.7 °C)   Resp 18   Ht 5' 5" (1.651 m)   Wt 44.9 kg (99 lb)   SpO2 99%   Breastfeeding No   BMI 16.47 kg/m²   Physical Exam  Constitutional:       Appearance: Normal appearance. She is not ill-appearing or diaphoretic.      Comments: Very thin   HENT:      Head: Normocephalic and atraumatic.      Right Ear: There is no impacted cerumen.      Left Ear: There is no impacted cerumen.      Nose: No congestion or rhinorrhea.      Mouth/Throat:      Pharynx: No oropharyngeal exudate or posterior oropharyngeal erythema.   Eyes:      General:         Right eye: No discharge.         Left eye: No discharge.   Neck:      Vascular: No carotid bruit.   Cardiovascular:      Heart sounds: No murmur heard.    No friction rub.   Pulmonary:      Breath sounds: No wheezing or rhonchi.   Abdominal:      Tenderness: There is no abdominal tenderness.      Hernia: No hernia is present.   Musculoskeletal:         General: No tenderness or signs of injury.      Cervical back: No tenderness.   Skin:     Findings: No erythema or rash.   Neurological:      General: No focal deficit present.      Coordination: Coordination normal.      Deep Tendon Reflexes: Reflexes normal.   Psychiatric:      Comments: Quiet,    "

## 2022-12-31 NOTE — H&P
[facility] Medicine  History & Physical    Patient Name: Jami Bach  MRN: [id number]  Patient Class: OP- Observation  Admission Date: 12/30/2022  Attending Physician: Billy Vann MD  Primary Care Provider: Brando Llanos MD         Patient information was obtained from patient and ER records.     Subjective:     Principal Problem:Overdose    Chief Complaint:   Chief Complaint   Patient presents with    Drug Overdose     Pt's brother states pt took a lot of Ibuprofen.  Pt nods her yes that she took Ibuprofen.  Pt will not say if she was trying to hurt herself.  Awake and alert.        HPI: Jami Bach is a 27yr old female with PMH of depression, hx of past suicide attempts, and PTSD, who presents with overdose.     Patient presented with brother to the ED from [facility]. Apparently, patient has taken approximately  ibuprofen tabs over the past 2-6 hours, which was thought to be due to a manic episode. Boyfriend believes this episode started the day before, when girlfriend believed patient was too friendly with a guest. Patient prefers not to speak much.    In the ED, vitals were unremarkable. CBC was unremarkable. CMP was positive for hypocalcemia and slightly elevated alkaline phosphtase. Drug tox was negative. UA was positive for RBC and some trace leukocytes. VBG was positive for elevated PCO2.     ED PEC-ed the patient. They contacted poison control, who recommended observation.     Medicine was consulted for observation of pt's medical condition, with likely transfer to psychiatric services once medically clear      Past Medical History:   Diagnosis Date    Depression     PTSD (post-traumatic stress disorder)        History reviewed. No pertinent surgical history.    Review of patient's allergies indicates:  No Known Allergies    No current facility-administered medications on file prior to encounter.     Current Outpatient Medications on File Prior to  Encounter   Medication Sig    clonazePAM (KLONOPIN) 0.5 MG tablet Take 1 tablet (0.5 mg total) by mouth nightly as needed (severe anxiety and/or difficulty sleeping).    risperiDONE (RISPERDAL) 2 MG tablet Take 1 tablet (2 mg total) by mouth 2 (two) times daily.     Family History    None       Tobacco Use    Smoking status: Every Day    Smokeless tobacco: Not on file   Substance and Sexual Activity    Alcohol use: No    Drug use: Not Currently    Sexual activity: Yes     Partners: Male     Review of Systems   Constitutional:  Negative for appetite change and chills.   HENT:  Negative for facial swelling and mouth sores.    Eyes:  Negative for photophobia and redness.   Respiratory:  Negative for cough and choking.    Gastrointestinal:  Negative for blood in stool and constipation.   Endocrine: Negative for polyphagia and polyuria.   Genitourinary:  Negative for dyspareunia and dysuria.   Musculoskeletal:  Negative for joint swelling and myalgias.   Skin:  Negative for rash and wound.   Allergic/Immunologic: Negative for food allergies and immunocompromised state.   Neurological:  Negative for seizures and numbness.   Hematological:  Negative for adenopathy. Does not bruise/bleed easily.   Psychiatric/Behavioral:  Positive for dysphoric mood, self-injury and suicidal ideas. Negative for hallucinations.    Objective:     Vital Signs (Most Recent):  Temp: 99.5 °F (37.5 °C) (12/30/22 2106)  Pulse: 79 (12/31/22 0202)  Resp: 16 (12/31/22 0202)  BP: 110/62 (12/31/22 0202)  SpO2: 100 % (12/31/22 0244) Vital Signs (24h Range):  Temp:  [99.5 °F (37.5 °C)] 99.5 °F (37.5 °C)  Pulse:  [79-90] 79  Resp:  [16-41] 16  SpO2:  [99 %-100 %] 100 %  BP: (110-116)/(62-66) 110/62     Weight: 44.9 kg (99 lb)  Body mass index is 16.47 kg/m².    Physical Exam  Constitutional:       Appearance: Normal appearance. She is not ill-appearing or diaphoretic.      Comments: Very thin   HENT:      Head: Normocephalic and atraumatic.       Right Ear: There is no impacted cerumen.      Left Ear: There is no impacted cerumen.      Nose: No congestion or rhinorrhea.      Mouth/Throat:      Pharynx: No oropharyngeal exudate or posterior oropharyngeal erythema.   Eyes:      General:         Right eye: No discharge.         Left eye: No discharge.   Neck:      Vascular: No carotid bruit.   Cardiovascular:      Heart sounds: No murmur heard.    No friction rub.   Pulmonary:      Breath sounds: No wheezing or rhonchi.   Abdominal:      Tenderness: There is no abdominal tenderness.      Hernia: No hernia is present.   Musculoskeletal:         General: No tenderness or signs of injury.      Cervical back: No tenderness.   Skin:     Findings: No erythema or rash.   Neurological:      General: No focal deficit present.      Coordination: Coordination normal.      Deep Tendon Reflexes: Reflexes normal.   Psychiatric:      Comments: Quiet,            Significant Labs: All pertinent labs within the past 24 hours have been reviewed.  A1C: No results for input(s): HGBA1C in the last 4320 hours.  Bilirubin:   Recent Labs   Lab 12/30/22  2354   BILITOT 0.3     Blood Culture: No results for input(s): LABBLOO in the last 48 hours.  BMP:   Recent Labs   Lab 12/30/22  2354   GLU 81      K 3.9      CO2 18*   BUN 10   CREATININE 0.7   CALCIUM 8.3*   MG 2.1     CBC:   Recent Labs   Lab 12/30/22  2354   WBC 7.83   HGB 12.6   HCT 35.7*        CMP:   Recent Labs   Lab 12/30/22  2354      K 3.9      CO2 18*   GLU 81   BUN 10   CREATININE 0.7   CALCIUM 8.3*   PROT 6.4   ALBUMIN 3.9   BILITOT 0.3   ALKPHOS 50*   AST 17   ALT 16   ANIONGAP 12     TSH:   Recent Labs   Lab 12/30/22  2354   TSH 0.532       Significant Imaging: I have reviewed all pertinent imaging results/findings within the past 24 hours.  I have reviewed and interpreted all pertinent imaging results/findings within the past 24 hours.    Assessment/Plan:     * Overdose  Overdose of  ibuprofen.   Poison Control recommended medicine observation  PEC-ed in ED   Consult Psych on floor for further care, likely transfer following medical clearance      Psychosis  Hold off any antipsychotics at this time  May give clonazapam for agigtation        VTE Risk Mitigation (From admission, onward)         Ordered     IP VTE LOW RISK PATIENT  Once         12/31/22 0120     Place sequential compression device  Until discontinued         12/31/22 0120                   Billy Vann MD  Department of Hospital Medicine   [facility] - Emergency Dept

## 2022-12-31 NOTE — ASSESSMENT & PLAN NOTE
Overdose of ibuprofen.   Poison Control recommended medicine observation  PEC-ed in ED   Consult Psych on floor for further care, likely transfer following medical clearance

## 2022-12-31 NOTE — DISCHARGE SUMMARY
[facility] Emergency Dept  [facility] Medicine  Discharge Summary      Patient Name: Jami Bach  MRN: [id number]  ARNOLD: [id number]  Patient Class: OP- Observation  Admission Date: 12/30/2022  Hospital Length of Stay: 0 days  Discharge Date and Time:  12/31/2022 12:06 PM  Attending Physician: Chavo Weems, *   Discharging Provider: Chavo Wemes MD  Primary Care Provider: Brando Llanos MD    Primary Care Team: Networked reference to record PCT     HPI:   Jami Bach is a 27yr old female with PMH of depression, hx of past suicide attempts, and PTSD, who presents with overdose.     Patient presented with brother to the ED from [facility]. Apparently, patient has taken approximately  ibuprofen tabs over the past 2-6 hours, which was thought to be due to a manic episode. Boyfriend believes this episode started the day before, when girlfriend believed patient was too friendly with a guest. Patient prefers not to speak much.    In the ED, vitals were unremarkable. CBC was unremarkable. CMP was positive for hypocalcemia and slightly elevated alkaline phosphtase. Drug tox was negative. UA was positive for RBC and some trace leukocytes. VBG was positive for elevated PCO2.     ED PEC-ed the patient. They contacted poison control, who recommended observation.     Medicine was consulted for observation of pt's medical condition, with likely transfer to psychiatric services once medically clear      * No surgery found *      Hospital Course:   Ms. Bach presented following suicide attempt with ingestion of between  ibuprofen tablets.  Some mild stomach upset overnight, but otherwise unremarkable exam.  Stomach pain has currently resolved and she is not had any bleeding.  Creatinine remains 0.8, which is her baseline.  Discussed with poison control line and she is cleared from ingestion standpoint.  Will discharge to inpatient psych.    /74   Pulse 95   Temp 98.1 °F (36.7 °C)   " Resp 18   Ht 5' 5" (1.651 m)   Wt 44.9 kg (99 lb)   SpO2 99%   Breastfeeding No   BMI 16.47 kg/m²   Physical Exam  Constitutional:       Appearance: Normal appearance. She is not ill-appearing or diaphoretic.      Comments: Very thin   HENT:      Head: Normocephalic and atraumatic.      Right Ear: There is no impacted cerumen.      Left Ear: There is no impacted cerumen.      Nose: No congestion or rhinorrhea.      Mouth/Throat:      Pharynx: No oropharyngeal exudate or posterior oropharyngeal erythema.   Eyes:      General:         Right eye: No discharge.         Left eye: No discharge.   Neck:      Vascular: No carotid bruit.   Cardiovascular:      Heart sounds: No murmur heard.    No friction rub.   Pulmonary:      Breath sounds: No wheezing or rhonchi.   Abdominal:      Tenderness: There is no abdominal tenderness.      Hernia: No hernia is present.   Musculoskeletal:         General: No tenderness or signs of injury.      Cervical back: No tenderness.   Skin:     Findings: No erythema or rash.   Neurological:      General: No focal deficit present.      Coordination: Coordination normal.      Deep Tendon Reflexes: Reflexes normal.   Psychiatric:      Comments: Quiet,         Goals of Care Treatment Preferences:  Code Status: Full Code      Consults:   Consults (From admission, onward)        Status Ordering Provider     Inpatient consult to Telemedicine - Psych  Once        Provider:  (Not yet assigned)    Acknowledged ABRAHAN DIOR          No new Assessment & Plan notes have been filed under this hospital service since the last note was generated.  Service: Hospital Medicine    Final Active Diagnoses:    Diagnosis Date Noted POA    PRINCIPAL PROBLEM:  Overdose [T50.901A] 12/31/2022 Yes     Chronic    Psychosis [F29] 09/16/2020 Yes     Chronic      Problems Resolved During this Admission:       Discharged Condition: stable    Disposition: Psychiatric Hospital    Follow Up:    Patient " Instructions:      Diet Adult Regular     Notify your health care provider if you experience any of the following:  temperature >100.4     Notify your health care provider if you experience any of the following:  persistent nausea and vomiting or diarrhea     Notify your health care provider if you experience any of the following:  severe uncontrolled pain     Notify your health care provider if you experience any of the following:  difficulty breathing or increased cough     Notify your health care provider if you experience any of the following:  severe persistent headache     Notify your health care provider if you experience any of the following:  persistent dizziness, light-headedness, or visual disturbances     Notify your health care provider if you experience any of the following:  increased confusion or weakness     Activity as tolerated       Significant Diagnostic Studies: see above    Pending Diagnostic Studies:     None         Medications:  Reconciled Home Medications:      Medication List      STOP taking these medications    clonazePAM 0.5 MG tablet  Commonly known as: KlonoPIN     risperiDONE 2 MG tablet  Commonly known as: RISPERDAL            Indwelling Lines/Drains at time of discharge:   Lines/Drains/Airways     None                 Time spent on the discharge of patient: 34 minutes         Chavo Weems MD  Department of Hospital Medicine  [facility] - Emergency Dept

## 2022-12-31 NOTE — ED NOTES
Spoke with Jerrica at [facility] in reference to patient and updated with lab values, patient status, and vital signs. Informed to call with any changes or questions regarding patient drug overdose. Will continue to follow patient until medically cleared.

## 2022-12-31 NOTE — PLAN OF CARE
12/31/22 1212   Post-Acute Status   Post-Acute Authorization Placement  (Worker placed PFC to the [facility] transfer center to initate Psych Placement for this patient.)

## 2022-12-31 NOTE — PLAN OF CARE
[facility]    FACILITY TRANSFER ORDERS      Patient Name: Jami Bach  YOB: 1995    PCP: Brando Llanos MD   PCP Address: [address] [place] [address] [place] [address] [place] [address]  PCP Phone Number: [phone]  PCP Fax: [phone]    Encounter Date: 12/31/2022    Admit to: inpatient psych    Vital Signs:  Routine    Diagnoses:   Active Hospital Problems    Diagnosis  POA    *Overdose [T50.901A]  Yes     Chronic    Psychosis [F29]  Yes     Chronic      Resolved Hospital Problems   No resolved problems to display.       Allergies:Review of patient's allergies indicates:  No Known Allergies    Diet: regular diet    Activities: Activity as tolerated    Goals of Care Treatment Preferences:  Code Status: Full Code      Nursing: per protocol     Labs: na    CONSULTS:     to evaluate for community resources/long-range planning.      WOUND CARE ORDERS  None    Medications: Review discharge medications with patient and family and provide education.      Current Discharge Medication List        STOP taking these medications       clonazePAM (KLONOPIN) 0.5 MG tablet Comments:   Reason for Stopping:         risperiDONE (RISPERDAL) 2 MG tablet Comments:   Reason for Stopping:                  Immunizations Administered as of 12/31/2022       No immunizations on file.               _________________________________  Chavo Weems MD  12/31/2022

## 2022-12-31 NOTE — NURSING
Pt to ER after intentional drug overdose of 50 Ibuprofen. Provider notified poison control and supportive care recommended. Pt has PEC in place, charge nurse notified  and placed copy in chart. Pt is resting quietly in bed at this time in blue paper scrubs. Sitter at bedside. [place]

## 2022-12-31 NOTE — ED PROVIDER NOTES
Encounter Date: 12/30/2022    SCRIBE #1 NOTE: I, Rony Sangeetha, am scribing for, and in the presence of,  Adilson Rodriguez.     History     Chief Complaint   Patient presents with    Drug Overdose     Pt's brother states pt took a lot of Ibuprofen.  Pt nods her yes that she took Ibuprofen.  Pt will not say if she was trying to hurt herself.  Awake and alert.     Jami Bach is a 27 y.o. female who  has a past medical history of PTSD (post-traumatic stress disorder).    The patient presents to the ED due to drug overdose.   Patient not willing to give details at this time but denies any acute pain. Patient's brother reports picking up patient from her boyfriend's house after boyfriend states that the patient ingested approximately  x 200 mg ibuprofen anytime from 2 to 6 hours ago. Patient's brother believes this was prompted by a recent manic episode yesterday, witnessed by patient's boyfriend, when boyfriend was believed to have been too friendly with a guest. History of suicide attempt 1 year ago and admitted to psych.        The history is provided by the patient and a relative.   Review of patient's allergies indicates:  No Known Allergies  Past Medical History:   Diagnosis Date    PTSD (post-traumatic stress disorder)      No past surgical history on file.  No family history on file.  Social History     Tobacco Use    Smoking status: Every Day   Substance Use Topics    Alcohol use: No     Review of Systems   Psychiatric/Behavioral:  Positive for dysphoric mood, self-injury and suicidal ideas.    All other systems reviewed and are negative.    Physical Exam     Initial Vitals   BP Pulse Resp Temp SpO2   12/30/22 2106 12/30/22 2106 12/30/22 2106 12/30/22 2106 12/30/22 2149   116/66 90 18 99.5 °F (37.5 °C) 99 %      MAP       --                Physical Exam    Nursing note and vitals reviewed.  Constitutional: She appears well-developed and well-nourished. She is not diaphoretic. No distress.   HENT:    Head: Normocephalic and atraumatic.   Nose: Nose normal.   Eyes: Conjunctivae and EOM are normal. Pupils are equal, round, and reactive to light.   Neck: Phonation normal. No tracheal deviation present.   Cardiovascular:  Normal rate, regular rhythm and normal heart sounds.           Pulmonary/Chest: No respiratory distress.   Abdominal: Abdomen is soft. She exhibits no distension. There is no abdominal tenderness.   Musculoskeletal:         General: No edema. Normal range of motion.     Neurological: She is alert and oriented to person, place, and time. GCS score is 15. GCS eye subscore is 4. GCS verbal subscore is 5. GCS motor subscore is 6.   Skin: Skin is warm and dry. Capillary refill takes less than 2 seconds.   Psychiatric: She exhibits a depressed mood.   Patient has a flat affect.  Tearful.       ED Course   Procedures  Labs Reviewed   URINALYSIS, REFLEX TO URINE CULTURE - Abnormal; Notable for the following components:       Result Value    Occult Blood UA 2+ (*)     Leukocytes, UA Trace (*)     All other components within normal limits    Narrative:     Specimen Source->Urine   URINALYSIS MICROSCOPIC - Abnormal; Notable for the following components:    RBC, UA 11 (*)     All other components within normal limits    Narrative:     Specimen Source->Urine   CBC W/ AUTO DIFFERENTIAL - Abnormal; Notable for the following components:    Hematocrit 35.7 (*)     Mono # 1.2 (*)     Mono % 15.2 (*)     All other components within normal limits   COMPREHENSIVE METABOLIC PANEL - Abnormal; Notable for the following components:    CO2 18 (*)     Calcium 8.3 (*)     Alkaline Phosphatase 50 (*)     All other components within normal limits   ISTAT PROCEDURE - Abnormal; Notable for the following components:    POC PH 7.346 (*)     POC PCO2 51.6 (*)     POC PO2 15 (*)     POC HCO3 28.2 (*)     POC SATURATED O2 17 (*)     POC TCO2 30 (*)     All other components within normal limits   DRUG SCREEN PANEL, URINE EMERGENCY     Narrative:     Specimen Source->Urine   LACTIC ACID, PLASMA   MAGNESIUM   PHOSPHORUS   TSH   APTT   PROTIME-INR   ALCOHOL,MEDICAL (ETHANOL)   ACETAMINOPHEN LEVEL   SALICYLATE LEVEL   COMPREHENSIVE METABOLIC PANEL   CBC WITHOUT DIFFERENTIAL   POCT URINE PREGNANCY   SARS-COV-2 RDRP GENE          Imaging Results    None          Medications   sodium chloride 0.9% flush 10 mL (has no administration in time range)   naloxone 0.4 mg/mL injection 0.02 mg (has no administration in time range)   glucose chewable tablet 16 g (has no administration in time range)   glucose chewable tablet 24 g (has no administration in time range)   glucagon (human recombinant) injection 1 mg (has no administration in time range)   dextrose 10% bolus 125 mL 125 mL (has no administration in time range)   dextrose 10% bolus 250 mL 250 mL (has no administration in time range)   acetaminophen tablet 650 mg (has no administration in time range)   melatonin tablet 6 mg (has no administration in time range)   clonazePAM tablet 0.5 mg (has no administration in time range)   sodium chloride 0.9% bolus 1,000 mL 1,000 mL (0 mLs Intravenous Stopped 12/30/22 2352)   pantoprazole injection 80 mg (80 mg Intravenous Given 12/30/22 2251)     Medical Decision Making:   Initial Assessment:   This is a 27-year-old female with previous history of psychiatric disorder, previous suicide attempt presents the ER for evaluation of intentional ibuprofen overdose.  Patient was brought in by brother who reports that patient's boyfriend stated that he believed patient took approximately 50 x 200 mg pills of ibuprofen.  Unsure of onset of ingestion.  Patient is noncooperative with exam she is depressed tearful flat affect.  She does have a history of previous suicide attempts.                 ED Course as of 12/31/22 0138   Fri Dec 30, 2022   2150 Will try to obtain more collateral with brother and significant other.  In the meantime will plan blood work Protonix likely  "admission.  Will sign pec. [SE]   2205  Discussed with poison control case #[id number]. Recommended supportive care and obs.  [SE]   2210 Obtain more information about overdose with patient.  Patient reports that she took "handful" of ibuprofen.  She reports she "stopped counting after 20 pills ". [SE]   2226 Sinus rhythm 83 beats per minute no STEMI [SE]      ED Course User Index  [SE] Adilson Rodriguez MD                     Clinical Impression:   Final diagnoses:  [Z00.8] Medical clearance for psychiatric admission  [R45.851] Suicidal ideation  [T50.902A] Intentional drug overdose, initial encounter  [T14.91XA] Suicide attempt (Primary)        ED Disposition Condition    Observation Stable               My Scribe Attestation: I acknowledge that the documentation on this chart was provided by described on the date of service noted above and that the documentation in the chart accurately reflects work and decisions made by me alone.       Adilson Rodriguez MD  12/31/22 0138    "

## 2022-12-31 NOTE — PHARMACY MED REC
"  [facility]           Pharmacy  Admission Medication History     The home medication history was taken by Cynthia Lopez.      Medication history obtained from Medications listed below were obtained from: [employer] software- [employer]. UNABLE TO ASSESS PATIENT    Based on information gathered for medication list, you may go to "Admission" then "Reconcile Home Medications" tabs to review and/or act upon those items.     The home medication list has been updated by the Pharmacy department.   Please read ALL comments highlighted in yellow.   Please address this information as you see fit.    Feel free to contact us if you have any questions or require assistance.        No current facility-administered medications on file prior to encounter.     Current Outpatient Medications on File Prior to Encounter   Medication Sig Dispense Refill    clonazePAM (KLONOPIN) 0.5 MG tablet Take 1 tablet (0.5 mg total) by mouth nightly as needed (severe anxiety and/or difficulty sleeping). (Patient not taking: Reported on 12/31/2022) 10 tablet 0    risperiDONE (RISPERDAL) 2 MG tablet Take 1 tablet (2 mg total) by mouth 2 (two) times daily. (Patient not taking: Reported on 12/31/2022) 60 tablet 0       Please address this information as you see fit.  Feel free to contact us if you have any questions or require assistance.    Cynthia Lopez  [phone]                .        "

## 2022-12-31 NOTE — ED NOTES
Pt reports she notified her brother about her transport to [place]. She reported she did not want me to call anyone to update them where she is going. She reports she will notify. Pt explained she is going to [place] and to be evaluated by psychiatrist. Verbalized understanding. Pt was picked up by Faith. Report given to Faith.

## 2023-01-23 ENCOUNTER — HOSPITAL ENCOUNTER (EMERGENCY)
Facility: HOSPITAL | Age: 28
Discharge: PSYCHIATRIC OR SUBSTANCE USE FACILITY | End: 2023-01-24
Attending: EMERGENCY MEDICINE
Payer: MEDICAID

## 2023-01-23 DIAGNOSIS — F22 PARANOIA (PSYCHOSIS): Primary | ICD-10-CM

## 2023-01-23 DIAGNOSIS — Z00.8 MEDICAL CLEARANCE FOR PSYCHIATRIC ADMISSION: ICD-10-CM

## 2023-01-23 LAB
ALBUMIN SERPL BCP-MCNC: 4.5 G/DL (ref 3.5–5.2)
ALP SERPL-CCNC: 53 U/L (ref 55–135)
ALT SERPL W/O P-5'-P-CCNC: 25 U/L (ref 10–44)
AMPHET+METHAMPHET UR QL: NEGATIVE
ANION GAP SERPL CALC-SCNC: 13 MMOL/L (ref 8–16)
APAP SERPL-MCNC: <3 UG/ML (ref 10–20)
AST SERPL-CCNC: 23 U/L (ref 10–40)
B-HCG UR QL: NEGATIVE
BARBITURATES UR QL SCN>200 NG/ML: NEGATIVE
BASOPHILS # BLD AUTO: 0.02 K/UL (ref 0–0.2)
BASOPHILS NFR BLD: 0.2 % (ref 0–1.9)
BENZODIAZ UR QL SCN>200 NG/ML: NEGATIVE
BILIRUB SERPL-MCNC: 0.5 MG/DL (ref 0.1–1)
BILIRUB UR QL STRIP: NEGATIVE
BUN SERPL-MCNC: 11 MG/DL (ref 6–20)
BZE UR QL SCN: NEGATIVE
CALCIUM SERPL-MCNC: 9.6 MG/DL (ref 8.7–10.5)
CANNABINOIDS UR QL SCN: NEGATIVE
CHLORIDE SERPL-SCNC: 102 MMOL/L (ref 95–110)
CLARITY UR: CLEAR
CO2 SERPL-SCNC: 22 MMOL/L (ref 23–29)
COLOR UR: YELLOW
CREAT SERPL-MCNC: 0.6 MG/DL (ref 0.5–1.4)
CREAT UR-MCNC: 80.3 MG/DL (ref 15–325)
CTP QC/QA: YES
DIFFERENTIAL METHOD: ABNORMAL
EOSINOPHIL # BLD AUTO: 0 K/UL (ref 0–0.5)
EOSINOPHIL NFR BLD: 0.1 % (ref 0–8)
ERYTHROCYTE [DISTWIDTH] IN BLOOD BY AUTOMATED COUNT: 12.3 % (ref 11.5–14.5)
EST. GFR  (NO RACE VARIABLE): >60 ML/MIN/1.73 M^2
ETHANOL SERPL-MCNC: <10 MG/DL
GLUCOSE SERPL-MCNC: 113 MG/DL (ref 70–110)
GLUCOSE UR QL STRIP: NEGATIVE
HCT VFR BLD AUTO: 39.6 % (ref 37–48.5)
HGB BLD-MCNC: 14 G/DL (ref 12–16)
HGB UR QL STRIP: NEGATIVE
IMM GRANULOCYTES # BLD AUTO: 0.02 K/UL (ref 0–0.04)
IMM GRANULOCYTES NFR BLD AUTO: 0.2 % (ref 0–0.5)
KETONES UR QL STRIP: ABNORMAL
LEUKOCYTE ESTERASE UR QL STRIP: NEGATIVE
LYMPHOCYTES # BLD AUTO: 0.8 K/UL (ref 1–4.8)
LYMPHOCYTES NFR BLD: 8 % (ref 18–48)
MCH RBC QN AUTO: 31.1 PG (ref 27–31)
MCHC RBC AUTO-ENTMCNC: 35.4 G/DL (ref 32–36)
MCV RBC AUTO: 88 FL (ref 82–98)
METHADONE UR QL SCN>300 NG/ML: NEGATIVE
MONOCYTES # BLD AUTO: 0.8 K/UL (ref 0.3–1)
MONOCYTES NFR BLD: 7.7 % (ref 4–15)
NEUTROPHILS # BLD AUTO: 8.7 K/UL (ref 1.8–7.7)
NEUTROPHILS NFR BLD: 83.8 % (ref 38–73)
NITRITE UR QL STRIP: NEGATIVE
NRBC BLD-RTO: 0 /100 WBC
OPIATES UR QL SCN: NEGATIVE
PCP UR QL SCN>25 NG/ML: NEGATIVE
PH UR STRIP: 7 [PH] (ref 5–8)
PLATELET # BLD AUTO: 261 K/UL (ref 150–450)
PMV BLD AUTO: 10.4 FL (ref 9.2–12.9)
POTASSIUM SERPL-SCNC: 3.8 MMOL/L (ref 3.5–5.1)
PROT SERPL-MCNC: 7.7 G/DL (ref 6–8.4)
PROT UR QL STRIP: NEGATIVE
RBC # BLD AUTO: 4.5 M/UL (ref 4–5.4)
SARS-COV-2 RDRP RESP QL NAA+PROBE: NEGATIVE
SODIUM SERPL-SCNC: 137 MMOL/L (ref 136–145)
SP GR UR STRIP: 1.02 (ref 1–1.03)
TOXICOLOGY INFORMATION: NORMAL
TSH SERPL DL<=0.005 MIU/L-ACNC: 1.51 UIU/ML (ref 0.4–4)
URN SPEC COLLECT METH UR: ABNORMAL
UROBILINOGEN UR STRIP-ACNC: NEGATIVE EU/DL
WBC # BLD AUTO: 10.4 K/UL (ref 3.9–12.7)

## 2023-01-23 PROCEDURE — 85025 COMPLETE CBC W/AUTO DIFF WBC: CPT | Performed by: EMERGENCY MEDICINE

## 2023-01-23 PROCEDURE — 80053 COMPREHEN METABOLIC PANEL: CPT | Performed by: EMERGENCY MEDICINE

## 2023-01-23 PROCEDURE — 80307 DRUG TEST PRSMV CHEM ANLYZR: CPT | Performed by: EMERGENCY MEDICINE

## 2023-01-23 PROCEDURE — 93010 ELECTROCARDIOGRAM REPORT: CPT | Mod: ,,, | Performed by: INTERNAL MEDICINE

## 2023-01-23 PROCEDURE — 80143 DRUG ASSAY ACETAMINOPHEN: CPT | Performed by: EMERGENCY MEDICINE

## 2023-01-23 PROCEDURE — 81003 URINALYSIS AUTO W/O SCOPE: CPT | Performed by: EMERGENCY MEDICINE

## 2023-01-23 PROCEDURE — 99285 EMERGENCY DEPT VISIT HI MDM: CPT

## 2023-01-23 PROCEDURE — 93010 EKG 12-LEAD: ICD-10-PCS | Mod: ,,, | Performed by: INTERNAL MEDICINE

## 2023-01-23 PROCEDURE — 99215 OFFICE O/P EST HI 40 MIN: CPT | Mod: 95,,, | Performed by: PSYCHIATRY & NEUROLOGY

## 2023-01-23 PROCEDURE — 82077 ASSAY SPEC XCP UR&BREATH IA: CPT | Performed by: EMERGENCY MEDICINE

## 2023-01-23 PROCEDURE — U0002 COVID-19 LAB TEST NON-CDC: HCPCS | Performed by: EMERGENCY MEDICINE

## 2023-01-23 PROCEDURE — 81025 URINE PREGNANCY TEST: CPT | Performed by: EMERGENCY MEDICINE

## 2023-01-23 PROCEDURE — 84443 ASSAY THYROID STIM HORMONE: CPT | Performed by: EMERGENCY MEDICINE

## 2023-01-23 PROCEDURE — 93005 ELECTROCARDIOGRAM TRACING: CPT

## 2023-01-23 PROCEDURE — 99215 PR OFFICE/OUTPT VISIT, EST, LEVL V, 40-54 MIN: ICD-10-PCS | Mod: 95,,, | Performed by: PSYCHIATRY & NEUROLOGY

## 2023-01-24 VITALS
BODY MASS INDEX: 16.46 KG/M2 | SYSTOLIC BLOOD PRESSURE: 122 MMHG | RESPIRATION RATE: 18 BRPM | DIASTOLIC BLOOD PRESSURE: 82 MMHG | TEMPERATURE: 99 F | OXYGEN SATURATION: 100 % | HEART RATE: 78 BPM | WEIGHT: 98.88 LBS
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
## 2023-01-24 NOTE — ED NOTES
Pain:  Rated 0/10.     Psychosocial:  Patient is calm and cooperative.  Appears clean, well maintained, with clothing appropriate to environment.       Neuro:  Eyes open spontaneously.  Awake, alert, oriented x 4.  Speech clear and appropriate.  Tolerating saliva secretions well.  Able to follow commands, demonstrating ability to actively and appropriately communicate within context of current conversation.  Symmetrical facial muscles.  Moving all extremities well with no noted weakness.  Adequate muscle tone present.    Movement is purposeful.       Airway:  Bilateral chest rise and fall.  RR regular and non-labored.         Circulatory:  Skin warm, dry, and pink.  Radial pulses strong and regular.  Capillary refill/skin blanching less than 3 seconds to distal of upper extremities.     Abdomen:  No related complaint.      Urinary:  No related complaints.

## 2023-01-24 NOTE — PROVIDER PROGRESS NOTES - EMERGENCY DEPT.
Encounter Date: 1/23/2023    ED Physician Progress Notes        Physician Note:   Accepted care of patient after case was thoroughly discussed with Dr. Shelley following medical clearance pending telepsychiatry consult.     Spoke with Dr. Perez after her evaluation of patient. She Believes patient is exhibiting signs of paranoia and would benefit from inpatient placement. Patient was previously PEC'd and has been medically cleared. We will seek appropriate inpatient placement via transfer center.
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
## 2023-01-24 NOTE — ED NOTES
Pill count of pt home medications performed with MD     Mirtazapine 15 mg tablets filled on Jan 9th. 25/30 pills present   Divalproex 250 mg tablets filled on Jan 9th. 49/90 pills present   Fluoxetine 20 mg tablets filled on Jan 9th. 34/60 pills present   Hydroxyzine 25 mg tablets (green) filled on Jan 9th. 50/90 pills present  Hydroxyzine 25 mg tablets (white) filled on Jan 19th. 84/90 pills present

## 2023-01-24 NOTE — ED NOTES
Pt follows commands when family member tells pt to sit up in wheelchair, pt does stand but has hands intertwined and refuses to speak

## 2023-01-24 NOTE — ED NOTES
Patient transport, RN, and security @ [facility] for pt transport to psych facility.  Sitter direct observation terminated.